# Patient Record
Sex: MALE | Race: WHITE | NOT HISPANIC OR LATINO | Employment: OTHER
[De-identification: names, ages, dates, MRNs, and addresses within clinical notes are randomized per-mention and may not be internally consistent; named-entity substitution may affect disease eponyms.]

---

## 2017-02-08 ENCOUNTER — IMAGING SERVICES (OUTPATIENT)
Dept: OTHER | Age: 72
End: 2017-02-08

## 2017-02-08 ENCOUNTER — HOSPITAL (OUTPATIENT)
Dept: OTHER | Age: 72
End: 2017-02-08

## 2017-07-21 ENCOUNTER — EXTERNAL RECORD (OUTPATIENT)
Dept: HEALTH INFORMATION MANAGEMENT | Age: 72
End: 2017-07-21

## 2017-07-21 ENCOUNTER — HOSPITAL (OUTPATIENT)
Dept: OTHER | Age: 72
End: 2017-07-21
Attending: INTERNAL MEDICINE

## 2017-07-21 ENCOUNTER — CHARTING TRANS (OUTPATIENT)
Dept: OTHER | Age: 72
End: 2017-07-21

## 2017-07-21 LAB
GLUCOSE BLDC GLUCOMTR-MCNC: 155 MG/DL (ref 65–99)
GLUCOSE BLDC GLUCOMTR-MCNC: 197 MG/DL (ref 65–99)

## 2017-07-22 ENCOUNTER — DIAGNOSTIC TRANS (OUTPATIENT)
Dept: OTHER | Age: 72
End: 2017-07-22

## 2017-07-22 LAB — GLUCOSE BLDC GLUCOMTR-MCNC: 243 MG/DL (ref 65–99)

## 2017-09-07 ENCOUNTER — DIAGNOSTIC TRANS (OUTPATIENT)
Dept: OTHER | Age: 72
End: 2017-09-07

## 2017-09-07 ENCOUNTER — HOSPITAL (OUTPATIENT)
Dept: OTHER | Age: 72
End: 2017-09-07
Attending: EMERGENCY MEDICINE

## 2017-09-07 LAB
ALBUMIN SERPL-MCNC: 4.5 GM/DL (ref 3.6–5.1)
ALBUMIN/GLOB SERPL: 1.3 {RATIO} (ref 1–2.4)
ALP SERPL-CCNC: 58 UNIT/L (ref 45–117)
ALT SERPL-CCNC: 22 UNIT/L
ANALYZER ANC (IANC): ABNORMAL
ANION GAP SERPL CALC-SCNC: 16 MMOL/L (ref 10–20)
APTT PPP: 25 SECONDS (ref 22–30)
APTT PPP: NORMAL S
AST SERPL-CCNC: 16 UNIT/L
BASOPHILS # BLD: 0 THOUSAND/MCL (ref 0–0.3)
BASOPHILS NFR BLD: 0 %
BILIRUB SERPL-MCNC: 0.9 MG/DL (ref 0.2–1)
BNP SERPL-MCNC: 17 PG/ML
BUN SERPL-MCNC: 13 MG/DL (ref 6–20)
BUN/CREAT SERPL: 16 (ref 7–25)
CALCIUM SERPL-MCNC: 10.6 MG/DL (ref 8.4–10.2)
CHLORIDE: 100 MMOL/L (ref 98–107)
CO2 SERPL-SCNC: 25 MMOL/L (ref 21–32)
CREAT SERPL-MCNC: 0.82 MG/DL (ref 0.67–1.17)
DIFFERENTIAL METHOD BLD: ABNORMAL
EOSINOPHIL # BLD: 0 THOUSAND/MCL (ref 0.1–0.5)
EOSINOPHIL NFR BLD: 0 %
ERYTHROCYTE [DISTWIDTH] IN BLOOD: 12.4 % (ref 11–15)
GLOBULIN SER-MCNC: 3.6 GM/DL (ref 2–4)
GLUCOSE SERPL-MCNC: 298 MG/DL (ref 65–99)
HEMATOCRIT: 42.5 % (ref 39–51)
HGB BLD-MCNC: 14.6 GM/DL (ref 13–17)
INR PPP: 1
LYMPHOCYTES # BLD: 1.1 THOUSAND/MCL (ref 1–4)
LYMPHOCYTES NFR BLD: 14 %
MAGNESIUM SERPL-MCNC: 1.8 MG/DL (ref 1.7–2.4)
MCH RBC QN AUTO: 28.9 PG (ref 26–34)
MCHC RBC AUTO-ENTMCNC: 34.4 GM/DL (ref 32–36.5)
MCV RBC AUTO: 84.2 FL (ref 78–100)
MONOCYTES # BLD: 1.4 THOUSAND/MCL (ref 0.3–0.9)
MONOCYTES NFR BLD: 17 %
NEUTROPHILS # BLD: 5.5 THOUSAND/MCL (ref 1.8–7.7)
NEUTROPHILS NFR BLD: 69 %
NEUTS SEG NFR BLD: ABNORMAL %
PERCENT NRBC: ABNORMAL
PLATELET # BLD: 185 THOUSAND/MCL (ref 140–450)
POTASSIUM SERPL-SCNC: 3.6 MMOL/L (ref 3.4–5.1)
PROT SERPL-MCNC: 8.1 GM/DL (ref 6.4–8.2)
PROTHROMBIN TIME: 11.6 SECONDS (ref 9.7–11.8)
PROTHROMBIN TIME: NORMAL
RBC # BLD: 5.05 MILLION/MCL (ref 4.5–5.9)
SODIUM SERPL-SCNC: 137 MMOL/L (ref 135–145)
TROPONIN I SERPL HS-MCNC: <0.02 NG/ML
TROPONIN I SERPL HS-MCNC: <0.02 NG/ML
WBC # BLD: 8 THOUSAND/MCL (ref 4.2–11)

## 2017-09-08 ENCOUNTER — CHARTING TRANS (OUTPATIENT)
Dept: OTHER | Age: 72
End: 2017-09-08

## 2017-09-12 PROBLEM — I47.1 PAROXYSMAL SVT (SUPRAVENTRICULAR TACHYCARDIA) (HCC): Status: ACTIVE | Noted: 2017-09-12

## 2017-09-12 PROBLEM — Z98.61 CAD S/P PERCUTANEOUS CORONARY ANGIOPLASTY: Status: ACTIVE | Noted: 2017-09-12

## 2017-09-12 PROBLEM — E13.9 DIABETES 1.5, MANAGED AS TYPE 2 (HCC): Status: ACTIVE | Noted: 2017-09-12

## 2017-09-12 PROBLEM — R55 SYNCOPE AND COLLAPSE: Status: ACTIVE | Noted: 2017-09-12

## 2017-09-12 PROBLEM — M54.41 CHRONIC MIDLINE LOW BACK PAIN WITH BILATERAL SCIATICA: Status: ACTIVE | Noted: 2017-09-12

## 2017-09-12 PROBLEM — G89.29 CHRONIC MIDLINE LOW BACK PAIN WITH BILATERAL SCIATICA: Status: ACTIVE | Noted: 2017-09-12

## 2017-09-12 PROBLEM — M54.42 CHRONIC MIDLINE LOW BACK PAIN WITH BILATERAL SCIATICA: Status: ACTIVE | Noted: 2017-09-12

## 2017-09-12 PROBLEM — I25.10 CAD S/P PERCUTANEOUS CORONARY ANGIOPLASTY: Status: ACTIVE | Noted: 2017-09-12

## 2017-09-12 PROBLEM — M48.00 SPINAL STENOSIS, UNSPECIFIED SPINAL REGION: Status: ACTIVE | Noted: 2017-09-12

## 2017-10-09 ENCOUNTER — CHARTING TRANS (OUTPATIENT)
Dept: OTHER | Age: 72
End: 2017-10-09

## 2018-01-14 PROBLEM — E11.59 TYPE 2 DIABETES MELLITUS WITH CIRCULATORY DISORDER, WITHOUT LONG-TERM CURRENT USE OF INSULIN (HCC): Status: ACTIVE | Noted: 2018-01-14

## 2018-01-18 PROBLEM — R55 SYNCOPE AND COLLAPSE: Status: RESOLVED | Noted: 2017-09-12 | Resolved: 2018-01-18

## 2018-03-29 ENCOUNTER — CHARTING TRANS (OUTPATIENT)
Dept: OTHER | Age: 73
End: 2018-03-29

## 2018-06-15 ENCOUNTER — HOSPITAL (OUTPATIENT)
Dept: OTHER | Age: 73
End: 2018-06-15

## 2018-06-15 LAB
BUN SERPL-MCNC: 14 MG/DL (ref 6–20)
BUN/CREAT SERPL: 17 (ref 7–25)
CREAT SERPL-MCNC: 0.83 MG/DL (ref 0.67–1.17)

## 2018-06-19 ENCOUNTER — HOSPITAL (OUTPATIENT)
Dept: OTHER | Age: 73
End: 2018-06-19
Attending: FAMILY MEDICINE

## 2018-07-09 ENCOUNTER — CHARTING TRANS (OUTPATIENT)
Dept: OTHER | Age: 73
End: 2018-07-09

## 2018-07-21 PROBLEM — I70.0 THORACIC AORTA ATHEROSCLEROSIS (HCC): Status: ACTIVE | Noted: 2017-01-01

## 2018-07-21 PROBLEM — I77.1 TORTUOUS AORTA (HCC): Status: ACTIVE | Noted: 2017-09-01

## 2018-09-11 ENCOUNTER — CHARTING TRANS (OUTPATIENT)
Dept: OTHER | Age: 73
End: 2018-09-11

## 2018-09-17 PROBLEM — M47.819 SPONDYLOARTHRITIS: Status: ACTIVE | Noted: 2018-05-16

## 2018-11-01 VITALS
SYSTOLIC BLOOD PRESSURE: 138 MMHG | HEIGHT: 71 IN | DIASTOLIC BLOOD PRESSURE: 60 MMHG | HEART RATE: 88 BPM | BODY MASS INDEX: 25.2 KG/M2 | WEIGHT: 179.99 LBS

## 2018-11-02 VITALS
HEART RATE: 100 BPM | HEIGHT: 70 IN | BODY MASS INDEX: 27.2 KG/M2 | OXYGEN SATURATION: 97 % | DIASTOLIC BLOOD PRESSURE: 66 MMHG | WEIGHT: 190 LBS | SYSTOLIC BLOOD PRESSURE: 136 MMHG

## 2018-11-02 VITALS
WEIGHT: 194 LBS | SYSTOLIC BLOOD PRESSURE: 105 MMHG | HEART RATE: 92 BPM | HEIGHT: 71 IN | DIASTOLIC BLOOD PRESSURE: 60 MMHG | BODY MASS INDEX: 27.16 KG/M2

## 2018-11-27 VITALS
DIASTOLIC BLOOD PRESSURE: 64 MMHG | SYSTOLIC BLOOD PRESSURE: 124 MMHG | WEIGHT: 181 LBS | HEART RATE: 68 BPM | HEIGHT: 71 IN | BODY MASS INDEX: 25.34 KG/M2

## 2019-02-19 RX ORDER — NITROGLYCERIN 0.4 MG/1
TABLET SUBLINGUAL
COMMUNITY
End: 2020-05-04 | Stop reason: SDUPTHER

## 2019-02-19 RX ORDER — HYDROCODONE BITARTRATE AND ACETAMINOPHEN 7.5; 325 MG/1; MG/1
TABLET ORAL
COMMUNITY
Start: 2018-09-11 | End: 2019-03-10

## 2019-02-21 PROBLEM — M47.819 SPONDYLOARTHROPATHY: Status: ACTIVE | Noted: 2018-05-16

## 2019-02-22 PROBLEM — R35.1 BENIGN PROSTATIC HYPERPLASIA WITH NOCTURIA: Status: ACTIVE | Noted: 2018-02-22

## 2019-02-22 PROBLEM — N40.1 BENIGN PROSTATIC HYPERPLASIA WITH NOCTURIA: Status: ACTIVE | Noted: 2018-02-22

## 2019-02-25 PROBLEM — I25.10 CAD (CORONARY ARTERY DISEASE): Status: ACTIVE | Noted: 2019-02-25

## 2019-02-25 PROBLEM — R94.09 ABNORMAL TILT TABLE TEST: Status: ACTIVE | Noted: 2019-02-25

## 2019-02-25 PROBLEM — I95.1 ORTHOSTATIC HYPOTENSION: Status: ACTIVE | Noted: 2019-02-25

## 2019-02-25 PROBLEM — I63.9 STROKE (CMD): Status: ACTIVE | Noted: 2019-02-25

## 2019-02-25 PROBLEM — R06.02 SHORTNESS OF BREATH: Status: ACTIVE | Noted: 2019-02-25

## 2019-02-25 RX ORDER — FAMOTIDINE 20 MG/1
TABLET, FILM COATED ORAL EVERY 12 HOURS
COMMUNITY

## 2019-02-25 RX ORDER — TRAMADOL HYDROCHLORIDE 50 MG/1
TABLET ORAL
COMMUNITY
End: 2021-11-11

## 2019-02-25 RX ORDER — CLOPIDOGREL BISULFATE 75 MG/1
75 TABLET ORAL DAILY
COMMUNITY
End: 2022-10-14 | Stop reason: ALTCHOICE

## 2019-02-25 RX ORDER — CHLORAL HYDRATE 500 MG
1000 CAPSULE ORAL DAILY
COMMUNITY

## 2019-03-01 ENCOUNTER — OFFICE VISIT (OUTPATIENT)
Dept: CARDIOLOGY | Age: 74
End: 2019-03-01

## 2019-03-01 VITALS
WEIGHT: 176 LBS | HEART RATE: 110 BPM | DIASTOLIC BLOOD PRESSURE: 60 MMHG | SYSTOLIC BLOOD PRESSURE: 120 MMHG | BODY MASS INDEX: 23.84 KG/M2 | HEIGHT: 72 IN

## 2019-03-01 DIAGNOSIS — I47.19 ATRIAL TACHYCARDIA: ICD-10-CM

## 2019-03-01 DIAGNOSIS — I95.1 ORTHOSTATIC HYPOTENSION: ICD-10-CM

## 2019-03-01 DIAGNOSIS — E78.5 HYPERLIPIDEMIA, UNSPECIFIED HYPERLIPIDEMIA TYPE: ICD-10-CM

## 2019-03-01 DIAGNOSIS — I25.10 CORONARY ARTERY DISEASE INVOLVING NATIVE HEART WITHOUT ANGINA PECTORIS, UNSPECIFIED VESSEL OR LESION TYPE: ICD-10-CM

## 2019-03-01 DIAGNOSIS — I63.9 CEREBROVASCULAR ACCIDENT (CVA), UNSPECIFIED MECHANISM (CMD): Primary | ICD-10-CM

## 2019-03-01 PROCEDURE — 99215 OFFICE O/P EST HI 40 MIN: CPT | Performed by: INTERNAL MEDICINE

## 2019-03-01 PROCEDURE — 93000 ELECTROCARDIOGRAM COMPLETE: CPT | Performed by: INTERNAL MEDICINE

## 2019-03-01 RX ORDER — FINASTERIDE 5 MG/1
1 TABLET, FILM COATED ORAL NIGHTLY
COMMUNITY
Start: 2017-09-01 | End: 2022-06-03 | Stop reason: DRUGHIGH

## 2019-03-01 RX ORDER — ROSUVASTATIN CALCIUM 5 MG/1
TABLET, COATED ORAL
COMMUNITY
Start: 2019-01-23

## 2019-03-01 RX ORDER — GLIPIZIDE 5 MG/1
TABLET ORAL
COMMUNITY
Start: 2018-01-18

## 2019-03-01 SDOH — HEALTH STABILITY: MENTAL HEALTH: HOW OFTEN DO YOU HAVE A DRINK CONTAINING ALCOHOL?: NEVER

## 2019-03-01 ASSESSMENT — ENCOUNTER SYMPTOMS
RESPIRATORY NEGATIVE: 1
GASTROINTESTINAL NEGATIVE: 1
ENDOCRINE NEGATIVE: 1
CONSTITUTIONAL NEGATIVE: 1
ORTHOPNEA: 0
SYNCOPE: 0
NEUROLOGICAL NEGATIVE: 1
HEMATOLOGIC/LYMPHATIC NEGATIVE: 1
ALLERGIC/IMMUNOLOGIC NEGATIVE: 1
PSYCHIATRIC NEGATIVE: 1
SHORTNESS OF BREATH: 0
EYES NEGATIVE: 1
COUGH: 0

## 2019-03-20 ENCOUNTER — LAB SERVICES (OUTPATIENT)
Dept: LAB | Age: 74
End: 2019-03-20

## 2019-03-20 DIAGNOSIS — I47.19 ATRIAL TACHYCARDIA: ICD-10-CM

## 2019-03-20 LAB
BASOPHILS # BLD AUTO: 0 K/MCL (ref 0–0.3)
BASOPHILS NFR BLD AUTO: 0 %
DIFFERENTIAL METHOD BLD: ABNORMAL
EOSINOPHIL # BLD AUTO: 0 K/MCL (ref 0.1–0.5)
EOSINOPHIL NFR SPEC: 0 %
ERYTHROCYTE [DISTWIDTH] IN BLOOD: 11.9 % (ref 11–15)
HCT VFR BLD CALC: 45 % (ref 39–51)
HGB BLD-MCNC: 15.4 G/DL (ref 13–17)
IMM GRANULOCYTES # BLD AUTO: 0.1 K/MCL (ref 0–0.2)
IMM GRANULOCYTES NFR BLD: 1 %
LYMPHOCYTES # BLD MANUAL: 1.8 K/MCL (ref 1–4)
LYMPHOCYTES NFR BLD MANUAL: 18 %
MCH RBC QN AUTO: 29.6 PG (ref 26–34)
MCHC RBC AUTO-ENTMCNC: 34.2 G/DL (ref 32–36.5)
MCV RBC AUTO: 86.5 FL (ref 78–100)
MONOCYTES # BLD MANUAL: 1.8 K/MCL (ref 0.3–0.9)
MONOCYTES NFR BLD MANUAL: 18 %
NEUTROPHILS # BLD: 6.2 K/MCL (ref 1.8–7.7)
NEUTROPHILS NFR BLD AUTO: 63 %
NRBC BLD MANUAL-RTO: 0 /100 WBC
PLATELET # BLD: 157 K/MCL (ref 140–450)
RBC # BLD: 5.2 MIL/MCL (ref 4.5–5.9)
TSH SERPL-ACNC: 0.87 MCUNITS/ML (ref 0.35–5)
WBC # BLD: 9.8 K/MCL (ref 4.2–11)

## 2019-03-26 ENCOUNTER — TELEPHONE (OUTPATIENT)
Dept: CARDIOLOGY | Age: 74
End: 2019-03-26

## 2019-05-13 PROCEDURE — 81003 URINALYSIS AUTO W/O SCOPE: CPT | Performed by: FAMILY MEDICINE

## 2019-06-14 ENCOUNTER — TELEPHONE (OUTPATIENT)
Dept: CARDIOLOGY | Age: 74
End: 2019-06-14

## 2019-08-27 ENCOUNTER — EXTERNAL RECORD (OUTPATIENT)
Dept: HEALTH INFORMATION MANAGEMENT | Age: 74
End: 2019-08-27

## 2019-08-27 PROBLEM — I47.1 ATRIAL TACHYCARDIA (HCC): Status: ACTIVE | Noted: 2019-03-01

## 2019-08-27 PROBLEM — R94.09 ABNORMAL TILT TABLE TEST: Status: ACTIVE | Noted: 2019-02-25

## 2019-09-09 ENCOUNTER — APPOINTMENT (OUTPATIENT)
Dept: CARDIOLOGY | Age: 74
End: 2019-09-09

## 2019-10-07 ENCOUNTER — OFFICE VISIT (OUTPATIENT)
Dept: CARDIOLOGY | Age: 74
End: 2019-10-07

## 2019-10-07 VITALS
SYSTOLIC BLOOD PRESSURE: 122 MMHG | DIASTOLIC BLOOD PRESSURE: 58 MMHG | HEART RATE: 68 BPM | HEIGHT: 72 IN | WEIGHT: 175 LBS | BODY MASS INDEX: 23.7 KG/M2

## 2019-10-07 DIAGNOSIS — I47.19 ATRIAL TACHYCARDIA: ICD-10-CM

## 2019-10-07 DIAGNOSIS — I95.1 ORTHOSTATIC HYPOTENSION: ICD-10-CM

## 2019-10-07 DIAGNOSIS — I25.10 CORONARY ARTERY DISEASE INVOLVING NATIVE HEART WITHOUT ANGINA PECTORIS, UNSPECIFIED VESSEL OR LESION TYPE: ICD-10-CM

## 2019-10-07 DIAGNOSIS — E78.5 HYPERLIPIDEMIA, UNSPECIFIED HYPERLIPIDEMIA TYPE: Primary | ICD-10-CM

## 2019-10-07 PROCEDURE — 99214 OFFICE O/P EST MOD 30 MIN: CPT | Performed by: INTERNAL MEDICINE

## 2019-10-07 SDOH — HEALTH STABILITY: MENTAL HEALTH: HOW OFTEN DO YOU HAVE A DRINK CONTAINING ALCOHOL?: NEVER

## 2019-10-07 ASSESSMENT — ENCOUNTER SYMPTOMS
ALLERGIC/IMMUNOLOGIC NEGATIVE: 1
NEUROLOGICAL NEGATIVE: 1
ORTHOPNEA: 0
PSYCHIATRIC NEGATIVE: 1
RESPIRATORY NEGATIVE: 1
SYNCOPE: 0
EYES NEGATIVE: 1
SHORTNESS OF BREATH: 0
CONSTITUTIONAL NEGATIVE: 1
GASTROINTESTINAL NEGATIVE: 1
COUGH: 0
HEMATOLOGIC/LYMPHATIC NEGATIVE: 1
ENDOCRINE NEGATIVE: 1

## 2019-10-07 ASSESSMENT — PATIENT HEALTH QUESTIONNAIRE - PHQ9
2. FEELING DOWN, DEPRESSED OR HOPELESS: NOT AT ALL
SUM OF ALL RESPONSES TO PHQ9 QUESTIONS 1 AND 2: 0
SUM OF ALL RESPONSES TO PHQ9 QUESTIONS 1 AND 2: 0
1. LITTLE INTEREST OR PLEASURE IN DOING THINGS: NOT AT ALL

## 2019-11-11 ENCOUNTER — TELEPHONE (OUTPATIENT)
Dept: CARDIOLOGY | Age: 74
End: 2019-11-11

## 2019-11-23 PROBLEM — R94.09 ABNORMAL TILT TABLE TEST: Status: RESOLVED | Noted: 2019-02-25 | Resolved: 2019-11-23

## 2019-12-05 PROBLEM — Z96.641 STATUS POST TOTAL REPLACEMENT OF RIGHT HIP: Status: ACTIVE | Noted: 2019-12-02

## 2019-12-05 PROBLEM — D72.829 LEUKOCYTOSIS: Status: ACTIVE | Noted: 2019-12-05

## 2019-12-07 PROBLEM — D62 ACUTE POSTOPERATIVE ANEMIA DUE TO EXPECTED BLOOD LOSS: Status: ACTIVE | Noted: 2019-12-07

## 2019-12-07 PROBLEM — D69.6 THROMBOCYTOPENIA (HCC): Status: ACTIVE | Noted: 2019-12-07

## 2020-03-02 ENCOUNTER — EXTERNAL RECORD (OUTPATIENT)
Dept: HEALTH INFORMATION MANAGEMENT | Age: 75
End: 2020-03-02

## 2020-04-08 DIAGNOSIS — E78.5 HYPERLIPIDEMIA, UNSPECIFIED HYPERLIPIDEMIA TYPE: Primary | ICD-10-CM

## 2020-04-13 ENCOUNTER — APPOINTMENT (OUTPATIENT)
Dept: CARDIOLOGY | Age: 75
End: 2020-04-13

## 2020-05-04 ENCOUNTER — OFFICE VISIT (OUTPATIENT)
Dept: CARDIOLOGY | Age: 75
End: 2020-05-04

## 2020-05-04 VITALS — SYSTOLIC BLOOD PRESSURE: 127 MMHG | DIASTOLIC BLOOD PRESSURE: 82 MMHG

## 2020-05-04 DIAGNOSIS — E78.5 HYPERLIPIDEMIA, UNSPECIFIED HYPERLIPIDEMIA TYPE: ICD-10-CM

## 2020-05-04 DIAGNOSIS — I95.1 ORTHOSTATIC HYPOTENSION: Primary | ICD-10-CM

## 2020-05-04 DIAGNOSIS — R94.09 ABNORMAL TILT TABLE TEST: ICD-10-CM

## 2020-05-04 DIAGNOSIS — I25.10 CORONARY ARTERY DISEASE INVOLVING NATIVE HEART WITHOUT ANGINA PECTORIS, UNSPECIFIED VESSEL OR LESION TYPE: ICD-10-CM

## 2020-05-04 DIAGNOSIS — I47.19 ATRIAL TACHYCARDIA: ICD-10-CM

## 2020-05-04 DIAGNOSIS — I63.9 CEREBROVASCULAR ACCIDENT (CVA), UNSPECIFIED MECHANISM (CMD): ICD-10-CM

## 2020-05-04 PROCEDURE — 99441 TELEPHONE E&M BY PHYSICIAN EST PT NOT ORIG PREV 7 DAYS 5-10 MIN: CPT | Performed by: INTERNAL MEDICINE

## 2020-05-04 RX ORDER — NITROGLYCERIN 0.4 MG/1
0.4 TABLET SUBLINGUAL EVERY 5 MIN PRN
Qty: 25 TABLET | Refills: 3 | Status: SHIPPED | OUTPATIENT
Start: 2020-05-04 | End: 2021-11-11 | Stop reason: SDUPTHER

## 2020-05-04 SDOH — HEALTH STABILITY: MENTAL HEALTH: HOW OFTEN DO YOU HAVE A DRINK CONTAINING ALCOHOL?: NEVER

## 2020-05-04 ASSESSMENT — ENCOUNTER SYMPTOMS
SHORTNESS OF BREATH: 0
CONSTITUTIONAL NEGATIVE: 1
COUGH: 0
SYNCOPE: 0
ENDOCRINE NEGATIVE: 1
DIZZINESS: 1
RESPIRATORY NEGATIVE: 1
HEMATOLOGIC/LYMPHATIC NEGATIVE: 1
PSYCHIATRIC NEGATIVE: 1
GASTROINTESTINAL NEGATIVE: 1
ALLERGIC/IMMUNOLOGIC NEGATIVE: 1
EYES NEGATIVE: 1
ORTHOPNEA: 0

## 2020-05-04 ASSESSMENT — PATIENT HEALTH QUESTIONNAIRE - PHQ9
SUM OF ALL RESPONSES TO PHQ9 QUESTIONS 1 AND 2: 0
1. LITTLE INTEREST OR PLEASURE IN DOING THINGS: NOT AT ALL
SUM OF ALL RESPONSES TO PHQ9 QUESTIONS 1 AND 2: 0
2. FEELING DOWN, DEPRESSED OR HOPELESS: NOT AT ALL

## 2020-07-14 ENCOUNTER — TELEPHONE (OUTPATIENT)
Dept: CARDIOLOGY | Age: 75
End: 2020-07-14

## 2020-07-21 ENCOUNTER — TELEPHONE (OUTPATIENT)
Dept: CARDIOLOGY | Age: 75
End: 2020-07-21

## 2020-08-17 PROBLEM — R97.20 ELEVATED PSA: Status: ACTIVE | Noted: 2020-08-17

## 2020-08-30 PROBLEM — D72.829 LEUKOCYTOSIS: Status: RESOLVED | Noted: 2019-12-05 | Resolved: 2020-08-30

## 2020-08-30 PROBLEM — D62 ACUTE POSTOPERATIVE ANEMIA DUE TO EXPECTED BLOOD LOSS: Status: RESOLVED | Noted: 2019-12-07 | Resolved: 2020-08-30

## 2020-09-11 ENCOUNTER — EXTERNAL RECORD (OUTPATIENT)
Dept: HEALTH INFORMATION MANAGEMENT | Age: 75
End: 2020-09-11

## 2020-11-03 ENCOUNTER — OFFICE VISIT (OUTPATIENT)
Dept: CARDIOLOGY | Age: 75
End: 2020-11-03

## 2020-11-03 VITALS
HEART RATE: 60 BPM | WEIGHT: 180 LBS | BODY MASS INDEX: 25.2 KG/M2 | SYSTOLIC BLOOD PRESSURE: 142 MMHG | DIASTOLIC BLOOD PRESSURE: 74 MMHG | HEIGHT: 71 IN

## 2020-11-03 DIAGNOSIS — E78.5 HYPERLIPIDEMIA, UNSPECIFIED HYPERLIPIDEMIA TYPE: ICD-10-CM

## 2020-11-03 DIAGNOSIS — I25.10 CORONARY ARTERY DISEASE INVOLVING NATIVE HEART WITHOUT ANGINA PECTORIS, UNSPECIFIED VESSEL OR LESION TYPE: Primary | ICD-10-CM

## 2020-11-03 DIAGNOSIS — I47.19 ATRIAL TACHYCARDIA: ICD-10-CM

## 2020-11-03 DIAGNOSIS — R94.09 ABNORMAL TILT TABLE TEST: ICD-10-CM

## 2020-11-03 DIAGNOSIS — I63.9 CEREBROVASCULAR ACCIDENT (CVA), UNSPECIFIED MECHANISM (CMD): ICD-10-CM

## 2020-11-03 PROCEDURE — 99214 OFFICE O/P EST MOD 30 MIN: CPT | Performed by: INTERNAL MEDICINE

## 2020-11-03 ASSESSMENT — ENCOUNTER SYMPTOMS
HEMATOLOGIC/LYMPHATIC NEGATIVE: 1
RESPIRATORY NEGATIVE: 1
PSYCHIATRIC NEGATIVE: 1
SHORTNESS OF BREATH: 0
COUGH: 0
EYES NEGATIVE: 1
ALLERGIC/IMMUNOLOGIC NEGATIVE: 1
GASTROINTESTINAL NEGATIVE: 1
ORTHOPNEA: 0
DIZZINESS: 0
CONSTITUTIONAL NEGATIVE: 1
ENDOCRINE NEGATIVE: 1
NEUROLOGICAL NEGATIVE: 1
SYNCOPE: 0

## 2021-04-05 PROBLEM — M47.819 SPONDYLOARTHROPATHY: Status: RESOLVED | Noted: 2018-05-16 | Resolved: 2021-04-05

## 2021-04-19 ENCOUNTER — EXTERNAL RECORD (OUTPATIENT)
Dept: HEALTH INFORMATION MANAGEMENT | Age: 76
End: 2021-04-19

## 2021-05-11 ENCOUNTER — OFFICE VISIT (OUTPATIENT)
Dept: CARDIOLOGY | Age: 76
End: 2021-05-11

## 2021-05-11 VITALS
HEIGHT: 71 IN | SYSTOLIC BLOOD PRESSURE: 114 MMHG | HEART RATE: 74 BPM | BODY MASS INDEX: 25.2 KG/M2 | DIASTOLIC BLOOD PRESSURE: 76 MMHG | WEIGHT: 180 LBS

## 2021-05-11 DIAGNOSIS — I47.10 PSVT (PAROXYSMAL SUPRAVENTRICULAR TACHYCARDIA): ICD-10-CM

## 2021-05-11 DIAGNOSIS — I25.10 CORONARY ARTERY DISEASE INVOLVING NATIVE HEART WITHOUT ANGINA PECTORIS, UNSPECIFIED VESSEL OR LESION TYPE: Primary | ICD-10-CM

## 2021-05-11 DIAGNOSIS — E78.5 HYPERLIPIDEMIA, UNSPECIFIED HYPERLIPIDEMIA TYPE: ICD-10-CM

## 2021-05-11 PROCEDURE — 99214 OFFICE O/P EST MOD 30 MIN: CPT | Performed by: INTERNAL MEDICINE

## 2021-05-11 RX ORDER — HYDROCODONE BITARTRATE AND ACETAMINOPHEN 7.5; 325 MG/1; MG/1
1 TABLET ORAL EVERY 6 HOURS PRN
COMMUNITY
Start: 2022-05-24

## 2021-05-11 ASSESSMENT — PATIENT HEALTH QUESTIONNAIRE - PHQ9
SUM OF ALL RESPONSES TO PHQ9 QUESTIONS 1 AND 2: 0
2. FEELING DOWN, DEPRESSED OR HOPELESS: NOT AT ALL
CLINICAL INTERPRETATION OF PHQ9 SCORE: NO FURTHER SCREENING NEEDED
CLINICAL INTERPRETATION OF PHQ2 SCORE: NO FURTHER SCREENING NEEDED
1. LITTLE INTEREST OR PLEASURE IN DOING THINGS: NOT AT ALL
SUM OF ALL RESPONSES TO PHQ9 QUESTIONS 1 AND 2: 0

## 2021-05-11 ASSESSMENT — ENCOUNTER SYMPTOMS
SHORTNESS OF BREATH: 0
GASTROINTESTINAL NEGATIVE: 1
EYES NEGATIVE: 1
NEUROLOGICAL NEGATIVE: 1
CONSTITUTIONAL NEGATIVE: 1
ORTHOPNEA: 0
COUGH: 0
HEMATOLOGIC/LYMPHATIC NEGATIVE: 1
SYNCOPE: 0
RESPIRATORY NEGATIVE: 1
ENDOCRINE NEGATIVE: 1
DIZZINESS: 0
ALLERGIC/IMMUNOLOGIC NEGATIVE: 1
PSYCHIATRIC NEGATIVE: 1

## 2021-05-27 ENCOUNTER — TELEPHONE (OUTPATIENT)
Dept: CARDIOLOGY | Age: 76
End: 2021-05-27

## 2021-06-01 ENCOUNTER — TELEPHONE (OUTPATIENT)
Dept: CARDIOLOGY | Age: 76
End: 2021-06-01

## 2021-10-02 PROBLEM — Z86.2 HISTORY OF THROMBOCYTOPENIA: Status: ACTIVE | Noted: 2021-10-02

## 2021-10-02 PROBLEM — D69.6 THROMBOCYTOPENIA (HCC): Status: RESOLVED | Noted: 2019-12-07 | Resolved: 2021-10-02

## 2021-11-11 ENCOUNTER — OFFICE VISIT (OUTPATIENT)
Dept: CARDIOLOGY | Age: 76
End: 2021-11-11

## 2021-11-11 VITALS
HEIGHT: 71 IN | HEART RATE: 64 BPM | WEIGHT: 190 LBS | DIASTOLIC BLOOD PRESSURE: 68 MMHG | BODY MASS INDEX: 26.6 KG/M2 | SYSTOLIC BLOOD PRESSURE: 148 MMHG

## 2021-11-11 DIAGNOSIS — I25.10 CORONARY ARTERY DISEASE INVOLVING NATIVE HEART WITHOUT ANGINA PECTORIS, UNSPECIFIED VESSEL OR LESION TYPE: Primary | ICD-10-CM

## 2021-11-11 PROCEDURE — 99214 OFFICE O/P EST MOD 30 MIN: CPT | Performed by: INTERNAL MEDICINE

## 2021-11-11 RX ORDER — NITROGLYCERIN 0.4 MG/1
0.4 TABLET SUBLINGUAL EVERY 5 MIN PRN
Qty: 25 TABLET | Refills: 3 | Status: SHIPPED | OUTPATIENT
Start: 2021-11-11

## 2021-11-11 ASSESSMENT — ENCOUNTER SYMPTOMS
CONSTITUTIONAL NEGATIVE: 1
DIZZINESS: 0
EYES NEGATIVE: 1
GASTROINTESTINAL NEGATIVE: 1
SYNCOPE: 0
RESPIRATORY NEGATIVE: 1
COUGH: 0
ENDOCRINE NEGATIVE: 1
PSYCHIATRIC NEGATIVE: 1
SHORTNESS OF BREATH: 0
HEMATOLOGIC/LYMPHATIC NEGATIVE: 1
ALLERGIC/IMMUNOLOGIC NEGATIVE: 1
ORTHOPNEA: 0
NEUROLOGICAL NEGATIVE: 1

## 2022-05-11 ENCOUNTER — OFFICE VISIT (OUTPATIENT)
Dept: CARDIOLOGY | Age: 77
End: 2022-05-11

## 2022-05-11 VITALS
HEIGHT: 71 IN | DIASTOLIC BLOOD PRESSURE: 58 MMHG | HEART RATE: 80 BPM | SYSTOLIC BLOOD PRESSURE: 110 MMHG | BODY MASS INDEX: 25.48 KG/M2 | WEIGHT: 182 LBS

## 2022-05-11 DIAGNOSIS — I47.19 ATRIAL TACHYCARDIA: ICD-10-CM

## 2022-05-11 DIAGNOSIS — E78.5 HYPERLIPIDEMIA, UNSPECIFIED HYPERLIPIDEMIA TYPE: ICD-10-CM

## 2022-05-11 DIAGNOSIS — I25.10 CORONARY ARTERY DISEASE INVOLVING NATIVE HEART WITHOUT ANGINA PECTORIS, UNSPECIFIED VESSEL OR LESION TYPE: Primary | ICD-10-CM

## 2022-05-11 PROCEDURE — 99214 OFFICE O/P EST MOD 30 MIN: CPT | Performed by: INTERNAL MEDICINE

## 2022-05-11 RX ORDER — UBIDECARENONE 200 MG
1 CAPSULE ORAL DAILY
COMMUNITY

## 2022-05-11 ASSESSMENT — ENCOUNTER SYMPTOMS
EYES NEGATIVE: 1
DIZZINESS: 0
GASTROINTESTINAL NEGATIVE: 1
ORTHOPNEA: 0
ALLERGIC/IMMUNOLOGIC NEGATIVE: 1
RESPIRATORY NEGATIVE: 1
NEUROLOGICAL NEGATIVE: 1
COUGH: 0
SYNCOPE: 0
HEMATOLOGIC/LYMPHATIC NEGATIVE: 1
PSYCHIATRIC NEGATIVE: 1
ENDOCRINE NEGATIVE: 1
SHORTNESS OF BREATH: 0
CONSTITUTIONAL NEGATIVE: 1

## 2022-05-19 ENCOUNTER — APPOINTMENT (OUTPATIENT)
Dept: GENERAL RADIOLOGY | Age: 77
End: 2022-05-19
Attending: EMERGENCY MEDICINE

## 2022-05-19 ENCOUNTER — HOSPITAL ENCOUNTER (OUTPATIENT)
Age: 77
Setting detail: OBSERVATION
Discharge: HOME | End: 2022-05-20
Attending: EMERGENCY MEDICINE | Admitting: INTERNAL MEDICINE

## 2022-05-19 ENCOUNTER — APPOINTMENT (OUTPATIENT)
Dept: CT IMAGING | Age: 77
End: 2022-05-19
Attending: EMERGENCY MEDICINE

## 2022-05-19 DIAGNOSIS — R42 DIZZINESS: Primary | ICD-10-CM

## 2022-05-19 DIAGNOSIS — I25.10 CORONARY ARTERY DISEASE INVOLVING NATIVE HEART WITHOUT ANGINA PECTORIS, UNSPECIFIED VESSEL OR LESION TYPE: ICD-10-CM

## 2022-05-19 LAB
ALBUMIN SERPL-MCNC: 4.6 G/DL (ref 3.6–5.1)
ALBUMIN/GLOB SERPL: 1.5 {RATIO} (ref 1–2.4)
ALP SERPL-CCNC: 46 UNITS/L (ref 45–117)
ALT SERPL-CCNC: 21 UNITS/L
ANION GAP SERPL CALC-SCNC: 9 MMOL/L (ref 7–19)
AST SERPL-CCNC: 12 UNITS/L
ATRIAL RATE (BPM): 63
BASOPHILS # BLD: 0 K/MCL (ref 0–0.3)
BASOPHILS NFR BLD: 0 %
BILIRUB SERPL-MCNC: 0.7 MG/DL (ref 0.2–1)
BUN SERPL-MCNC: 21 MG/DL (ref 6–20)
BUN/CREAT SERPL: 19 (ref 7–25)
CALCIUM SERPL-MCNC: 10.3 MG/DL (ref 8.4–10.2)
CHLORIDE SERPL-SCNC: 109 MMOL/L (ref 97–110)
CO2 SERPL-SCNC: 24 MMOL/L (ref 21–32)
CREAT SERPL-MCNC: 1.13 MG/DL (ref 0.67–1.17)
DEPRECATED RDW RBC: 39.1 FL (ref 39–50)
EOSINOPHIL # BLD: 0 K/MCL (ref 0–0.5)
EOSINOPHIL NFR BLD: 0 %
ERYTHROCYTE [DISTWIDTH] IN BLOOD: 12.6 % (ref 11–15)
FASTING DURATION TIME PATIENT: ABNORMAL H
FLUAV RNA RESP QL NAA+PROBE: NOT DETECTED
FLUBV RNA RESP QL NAA+PROBE: NOT DETECTED
GFR SERPLBLD BASED ON 1.73 SQ M-ARVRAT: 67 ML/MIN
GLOBULIN SER-MCNC: 3.1 G/DL (ref 2–4)
GLUCOSE SERPL-MCNC: 134 MG/DL (ref 70–99)
HCT VFR BLD CALC: 44.7 % (ref 39–51)
HGB BLD-MCNC: 15 G/DL (ref 13–17)
IMM GRANULOCYTES # BLD AUTO: 0.2 K/MCL (ref 0–0.2)
IMM GRANULOCYTES # BLD: 1 %
LYMPHOCYTES # BLD: 1.4 K/MCL (ref 1–4)
LYMPHOCYTES NFR BLD: 13 %
MCH RBC QN AUTO: 28.7 PG (ref 26–34)
MCHC RBC AUTO-ENTMCNC: 33.6 G/DL (ref 32–36.5)
MCV RBC AUTO: 85.5 FL (ref 78–100)
MONOCYTES # BLD: 1.5 K/MCL (ref 0.3–0.9)
MONOCYTES NFR BLD: 14 %
NEUTROPHILS # BLD: 7.8 K/MCL (ref 1.8–7.7)
NEUTROPHILS NFR BLD: 72 %
NRBC BLD MANUAL-RTO: 0 /100 WBC
P AXIS (DEGREES): 63
PLATELET # BLD AUTO: 151 K/MCL (ref 140–450)
POTASSIUM SERPL-SCNC: 5.1 MMOL/L (ref 3.4–5.1)
PR-INTERVAL (MSEC): 148
PROT SERPL-MCNC: 7.7 G/DL (ref 6.4–8.2)
QRS-INTERVAL (MSEC): 84
QT-INTERVAL (MSEC): 408
QTC: 418
R AXIS (DEGREES): 64
RBC # BLD: 5.23 MIL/MCL (ref 4.5–5.9)
REPORT TEXT: NORMAL
RSV AG NPH QL IA.RAPID: NOT DETECTED
SARS-COV-2 RNA RESP QL NAA+PROBE: NOT DETECTED
SERVICE CMNT-IMP: NORMAL
SERVICE CMNT-IMP: NORMAL
SODIUM SERPL-SCNC: 137 MMOL/L (ref 135–145)
T AXIS (DEGREES): 64
TROPONIN I SERPL DL<=0.01 NG/ML-MCNC: <4 NG/L
VENTRICULAR RATE EKG/MIN (BPM): 63
WBC # BLD: 10.9 K/MCL (ref 4.2–11)

## 2022-05-19 PROCEDURE — 85025 COMPLETE CBC W/AUTO DIFF WBC: CPT | Performed by: EMERGENCY MEDICINE

## 2022-05-19 PROCEDURE — C9803 HOPD COVID-19 SPEC COLLECT: HCPCS

## 2022-05-19 PROCEDURE — 36415 COLL VENOUS BLD VENIPUNCTURE: CPT

## 2022-05-19 PROCEDURE — 99285 EMERGENCY DEPT VISIT HI MDM: CPT

## 2022-05-19 PROCEDURE — 93010 ELECTROCARDIOGRAM REPORT: CPT | Performed by: INTERNAL MEDICINE

## 2022-05-19 PROCEDURE — 0241U COVID/FLU/RSV PANEL: CPT | Performed by: STUDENT IN AN ORGANIZED HEALTH CARE EDUCATION/TRAINING PROGRAM

## 2022-05-19 PROCEDURE — 84484 ASSAY OF TROPONIN QUANT: CPT | Performed by: EMERGENCY MEDICINE

## 2022-05-19 PROCEDURE — 93005 ELECTROCARDIOGRAM TRACING: CPT | Performed by: EMERGENCY MEDICINE

## 2022-05-19 PROCEDURE — G0378 HOSPITAL OBSERVATION PER HR: HCPCS

## 2022-05-19 PROCEDURE — 70450 CT HEAD/BRAIN W/O DYE: CPT

## 2022-05-19 PROCEDURE — G1004 CDSM NDSC: HCPCS

## 2022-05-19 PROCEDURE — 80053 COMPREHEN METABOLIC PANEL: CPT | Performed by: EMERGENCY MEDICINE

## 2022-05-19 PROCEDURE — 71046 X-RAY EXAM CHEST 2 VIEWS: CPT

## 2022-05-19 ASSESSMENT — ENCOUNTER SYMPTOMS
ABDOMINAL PAIN: 0
WEAKNESS: 0
SHORTNESS OF BREATH: 0
CHILLS: 0
COUGH: 0
FEVER: 0
NAUSEA: 0
DIZZINESS: 1
CHEST TIGHTNESS: 0
PHOTOPHOBIA: 0
VOMITING: 0
NUMBNESS: 0
BACK PAIN: 0

## 2022-05-19 ASSESSMENT — PAIN SCALES - GENERAL: PAINLEVEL_OUTOF10: 0

## 2022-05-20 ENCOUNTER — APPOINTMENT (OUTPATIENT)
Dept: CARDIOLOGY | Age: 77
End: 2022-05-20
Attending: INTERNAL MEDICINE

## 2022-05-20 VITALS
DIASTOLIC BLOOD PRESSURE: 77 MMHG | TEMPERATURE: 97.5 F | RESPIRATION RATE: 18 BRPM | HEIGHT: 71 IN | BODY MASS INDEX: 25.46 KG/M2 | HEART RATE: 90 BPM | WEIGHT: 181.88 LBS | SYSTOLIC BLOOD PRESSURE: 139 MMHG | OXYGEN SATURATION: 94 %

## 2022-05-20 LAB
AORTIC VALVE AREA: NORMAL
ASCENDING AORTA (AAD): 3.1
AV MEAN GRADIENT (AVMG): NORMAL
AV MEAN VELOCITY (AVMV): NORMAL
AV PEAK GRADIENT (AVPG): NORMAL
AV PEAK VELOCITY (AVPV): NORMAL
AV STENOSIS SEVERITY TEXT: NORMAL
E WAVE DECELARATION TIME (MDT): 180
EST RIGHT VENT SYSTOLIC PRESSURE BY TRICUSPID REGURGITATION JET (RVSP): 25.85
GLUCOSE BLDC GLUCOMTR-MCNC: 120 MG/DL (ref 70–99)
GLUCOSE BLDC GLUCOMTR-MCNC: 125 MG/DL (ref 70–99)
GLUCOSE BLDC GLUCOMTR-MCNC: 144 MG/DL (ref 70–99)
GLUCOSE BLDC GLUCOMTR-MCNC: 184 MG/DL (ref 70–99)
GLUCOSE BLDC GLUCOMTR-MCNC: 205 MG/DL (ref 70–99)
LEFT INTERNAL DIMENSION IN SYSTOLE (LVSD): 2.99
LEFT VENTRICULAR INTERNAL DIMENSION IN DIASTOLE (LVDD): 4.52
LV EF: NORMAL %
MV E TISSUE VEL LAT (MELV): 8.27
MV E TISSUE VEL MED (MESV): 5.55
MV E WAVE VEL/E TISSUE VEL MED(MSR): 12.47
RAINBOW EXTRA TUBES HOLD SPECIMEN: NORMAL
TRICUSPID ANNULAR PLANE SYSTOLIC EXCURSION (TAPSE): 2.33
TRICUSPID VALVE ANNULAR PEAK VELOCITY (TVAPV): 17.4

## 2022-05-20 PROCEDURE — G0378 HOSPITAL OBSERVATION PER HR: HCPCS

## 2022-05-20 PROCEDURE — 10004651 HB RX, NO CHARGE ITEM: Performed by: INTERNAL MEDICINE

## 2022-05-20 PROCEDURE — 93306 TTE W/DOPPLER COMPLETE: CPT | Performed by: INTERNAL MEDICINE

## 2022-05-20 PROCEDURE — 13003287

## 2022-05-20 PROCEDURE — 10002800 HB RX 250 W HCPCS: Performed by: INTERNAL MEDICINE

## 2022-05-20 PROCEDURE — 99214 OFFICE O/P EST MOD 30 MIN: CPT | Performed by: INTERNAL MEDICINE

## 2022-05-20 PROCEDURE — 82962 GLUCOSE BLOOD TEST: CPT

## 2022-05-20 PROCEDURE — 99285 EMERGENCY DEPT VISIT HI MDM: CPT | Performed by: EMERGENCY MEDICINE

## 2022-05-20 PROCEDURE — 10004281 HB COUNTER-STAFF TIME PER 15 MIN

## 2022-05-20 PROCEDURE — 96372 THER/PROPH/DIAG INJ SC/IM: CPT

## 2022-05-20 PROCEDURE — 93306 TTE W/DOPPLER COMPLETE: CPT

## 2022-05-20 PROCEDURE — 10002807 HB RX 258: Performed by: INTERNAL MEDICINE

## 2022-05-20 PROCEDURE — 99220 INITIAL OBSERVATION CARE,LEVL III: CPT | Performed by: INTERNAL MEDICINE

## 2022-05-20 PROCEDURE — 10002803 HB RX 637: Performed by: INTERNAL MEDICINE

## 2022-05-20 RX ORDER — NICOTINE POLACRILEX 4 MG
15 LOZENGE BUCCAL PRN
Status: DISCONTINUED | OUTPATIENT
Start: 2022-05-20 | End: 2022-05-20 | Stop reason: HOSPADM

## 2022-05-20 RX ORDER — FINASTERIDE 5 MG/1
5 TABLET, FILM COATED ORAL NIGHTLY
Status: DISCONTINUED | OUTPATIENT
Start: 2022-05-20 | End: 2022-05-20 | Stop reason: HOSPADM

## 2022-05-20 RX ORDER — BISACODYL 10 MG
10 SUPPOSITORY, RECTAL RECTAL DAILY PRN
Status: DISCONTINUED | OUTPATIENT
Start: 2022-05-20 | End: 2022-05-20 | Stop reason: HOSPADM

## 2022-05-20 RX ORDER — FAMOTIDINE 20 MG/1
20 TABLET, FILM COATED ORAL EVERY 12 HOURS SCHEDULED
Status: DISCONTINUED | OUTPATIENT
Start: 2022-05-20 | End: 2022-05-20 | Stop reason: HOSPADM

## 2022-05-20 RX ORDER — CLOPIDOGREL BISULFATE 75 MG/1
75 TABLET ORAL DAILY
Status: DISCONTINUED | OUTPATIENT
Start: 2022-05-20 | End: 2022-05-20 | Stop reason: HOSPADM

## 2022-05-20 RX ORDER — DEXTROSE MONOHYDRATE 25 G/50ML
12.5 INJECTION, SOLUTION INTRAVENOUS PRN
Status: DISCONTINUED | OUTPATIENT
Start: 2022-05-20 | End: 2022-05-20 | Stop reason: HOSPADM

## 2022-05-20 RX ORDER — DEXTROSE MONOHYDRATE 25 G/50ML
25 INJECTION, SOLUTION INTRAVENOUS PRN
Status: DISCONTINUED | OUTPATIENT
Start: 2022-05-20 | End: 2022-05-20 | Stop reason: HOSPADM

## 2022-05-20 RX ORDER — HEPARIN SODIUM 5000 [USP'U]/ML
5000 INJECTION, SOLUTION INTRAVENOUS; SUBCUTANEOUS EVERY 8 HOURS SCHEDULED
Status: DISCONTINUED | OUTPATIENT
Start: 2022-05-20 | End: 2022-05-20 | Stop reason: HOSPADM

## 2022-05-20 RX ORDER — GLIPIZIDE 5 MG/1
10 TABLET ORAL 2 TIMES DAILY WITH MEALS
Status: DISCONTINUED | OUTPATIENT
Start: 2022-05-20 | End: 2022-05-20 | Stop reason: HOSPADM

## 2022-05-20 RX ORDER — NICOTINE POLACRILEX 4 MG
30 LOZENGE BUCCAL PRN
Status: DISCONTINUED | OUTPATIENT
Start: 2022-05-20 | End: 2022-05-20 | Stop reason: HOSPADM

## 2022-05-20 RX ORDER — AMOXICILLIN 250 MG
2 CAPSULE ORAL DAILY PRN
Status: DISCONTINUED | OUTPATIENT
Start: 2022-05-20 | End: 2022-05-20 | Stop reason: HOSPADM

## 2022-05-20 RX ORDER — ACETAMINOPHEN 325 MG/1
650 TABLET ORAL EVERY 4 HOURS PRN
Status: DISCONTINUED | OUTPATIENT
Start: 2022-05-20 | End: 2022-05-20 | Stop reason: HOSPADM

## 2022-05-20 RX ORDER — MULTIVIT WITH MINERALS/LUTEIN
1 TABLET ORAL DAILY
COMMUNITY

## 2022-05-20 RX ORDER — 0.9 % SODIUM CHLORIDE 0.9 %
2 VIAL (ML) INJECTION EVERY 12 HOURS SCHEDULED
Status: DISCONTINUED | OUTPATIENT
Start: 2022-05-20 | End: 2022-05-20 | Stop reason: HOSPADM

## 2022-05-20 RX ORDER — ROSUVASTATIN CALCIUM 5 MG/1
5 TABLET, COATED ORAL DAILY
Status: DISCONTINUED | OUTPATIENT
Start: 2022-05-20 | End: 2022-05-20 | Stop reason: HOSPADM

## 2022-05-20 RX ORDER — SODIUM CHLORIDE 9 MG/ML
INJECTION, SOLUTION INTRAVENOUS CONTINUOUS
Status: DISCONTINUED | OUTPATIENT
Start: 2022-05-20 | End: 2022-05-20 | Stop reason: HOSPADM

## 2022-05-20 RX ORDER — TRAMADOL HYDROCHLORIDE 50 MG/1
50 TABLET ORAL EVERY 6 HOURS PRN
Status: DISCONTINUED | OUTPATIENT
Start: 2022-05-20 | End: 2022-05-20 | Stop reason: HOSPADM

## 2022-05-20 RX ORDER — LANOLIN ALCOHOL/MO/W.PET/CERES
3 CREAM (GRAM) TOPICAL NIGHTLY PRN
Status: DISCONTINUED | OUTPATIENT
Start: 2022-05-20 | End: 2022-05-20 | Stop reason: HOSPADM

## 2022-05-20 RX ADMIN — HEPARIN SODIUM 5000 UNITS: 5000 INJECTION INTRAVENOUS; SUBCUTANEOUS at 05:33

## 2022-05-20 RX ADMIN — METOPROLOL TARTRATE 50 MG: 25 TABLET, FILM COATED ORAL at 14:51

## 2022-05-20 RX ADMIN — ASPIRIN 81 MG: 81 TABLET, COATED ORAL at 14:51

## 2022-05-20 RX ADMIN — CLOPIDOGREL BISULFATE 75 MG: 75 TABLET, FILM COATED ORAL at 14:51

## 2022-05-20 RX ADMIN — SODIUM CHLORIDE, PRESERVATIVE FREE 2 ML: 5 INJECTION INTRAVENOUS at 00:47

## 2022-05-20 RX ADMIN — INSULIN LISPRO 1 UNITS: 100 INJECTION, SOLUTION INTRAVENOUS; SUBCUTANEOUS at 11:54

## 2022-05-20 RX ADMIN — SODIUM CHLORIDE, PRESERVATIVE FREE 2 ML: 5 INJECTION INTRAVENOUS at 09:00

## 2022-05-20 RX ADMIN — HEPARIN SODIUM 5000 UNITS: 5000 INJECTION INTRAVENOUS; SUBCUTANEOUS at 00:45

## 2022-05-20 RX ADMIN — FAMOTIDINE 20 MG: 20 TABLET ORAL at 14:52

## 2022-05-20 RX ADMIN — HEPARIN SODIUM 5000 UNITS: 5000 INJECTION INTRAVENOUS; SUBCUTANEOUS at 14:54

## 2022-05-20 RX ADMIN — GLIPIZIDE 10 MG: 5 TABLET ORAL at 18:27

## 2022-05-20 RX ADMIN — SODIUM CHLORIDE: 9 INJECTION, SOLUTION INTRAVENOUS at 15:50

## 2022-05-20 RX ADMIN — SODIUM CHLORIDE 500 ML: 9 INJECTION, SOLUTION INTRAVENOUS at 13:08

## 2022-05-20 ASSESSMENT — PATIENT HEALTH QUESTIONNAIRE - PHQ9
CLINICAL INTERPRETATION OF PHQ2 SCORE: NO FURTHER SCREENING NEEDED
2. FEELING DOWN, DEPRESSED OR HOPELESS: NOT AT ALL
SUM OF ALL RESPONSES TO PHQ9 QUESTIONS 1 AND 2: 0
IS PATIENT ABLE TO COMPLETE PHQ2 OR PHQ9: YES
1. LITTLE INTEREST OR PLEASURE IN DOING THINGS: NOT AT ALL
SUM OF ALL RESPONSES TO PHQ9 QUESTIONS 1 AND 2: 0

## 2022-05-20 ASSESSMENT — ENCOUNTER SYMPTOMS
ABDOMINAL PAIN: 0
CONSTIPATION: 0
VOMITING: 0
COLOR CHANGE: 0
CHILLS: 0
HEADACHES: 0
CHEST TIGHTNESS: 0
NUMBNESS: 0
FEVER: 0
FATIGUE: 0
SORE THROAT: 0
DIAPHORESIS: 1
DIARRHEA: 0
LIGHT-HEADEDNESS: 1
WEAKNESS: 0
SHORTNESS OF BREATH: 0
DIZZINESS: 0

## 2022-05-20 ASSESSMENT — ACTIVITIES OF DAILY LIVING (ADL)
DESCRIBE HOW PAIN IMPACTS YOUR LIFE: NONE/CAN MANAGE
CHRONIC_PAIN_PRESENT: YES, CHRONIC
RECENT_DECLINE_ADL: NO
ADL_SHORT_OF_BREATH: NO
ADL_SCORE: 12
ADL_BEFORE_ADMISSION: INDEPENDENT

## 2022-05-20 ASSESSMENT — COGNITIVE AND FUNCTIONAL STATUS - GENERAL
BECAUSE OF A PHYSICAL, MENTAL, OR EMOTIONAL CONDITION, DO YOU HAVE DIFFICULTY DOING ERRANDS ALONE: NO
BECAUSE OF A PHYSICAL, MENTAL, OR EMOTIONAL CONDITION, DO YOU HAVE SERIOUS DIFFICULTY CONCENTRATING, REMEMBERING OR MAKING DECISIONS: NO
ARE YOU DEAF OR DO YOU HAVE SERIOUS DIFFICULTY  HEARING: YES
DO YOU HAVE SERIOUS DIFFICULTY WALKING OR CLIMBING STAIRS: NO
DO YOU HAVE DIFFICULTY DRESSING OR BATHING: NO
ARE YOU BLIND OR DO YOU HAVE SERIOUS DIFFICULTY SEEING, EVEN WHEN WEARING GLASSES: NO

## 2022-05-20 ASSESSMENT — COLUMBIA-SUICIDE SEVERITY RATING SCALE - C-SSRS
1. WITHIN THE PAST MONTH, HAVE YOU WISHED YOU WERE DEAD OR WISHED YOU COULD GO TO SLEEP AND NOT WAKE UP?: NO
6. HAVE YOU EVER DONE ANYTHING, STARTED TO DO ANYTHING, OR PREPARED TO DO ANYTHING TO END YOUR LIFE?: NO
2. HAVE YOU ACTUALLY HAD ANY THOUGHTS OF KILLING YOURSELF?: NO
IS THE PATIENT ABLE TO COMPLETE C-SSRS: YES

## 2022-05-20 ASSESSMENT — LIFESTYLE VARIABLES
ALCOHOL_USE_STATUS: NO OR LOW RISK WITH VALIDATED TOOL
HOW OFTEN DO YOU HAVE 6 OR MORE DRINKS ON ONE OCCASION: NEVER
HOW MANY STANDARD DRINKS CONTAINING ALCOHOL DO YOU HAVE ON A TYPICAL DAY: 0,1 OR 2
HOW OFTEN DO YOU HAVE A DRINK CONTAINING ALCOHOL: NEVER
AUDIT-C TOTAL SCORE: 0

## 2022-05-20 ASSESSMENT — PAIN SCALES - GENERAL
PAINLEVEL_OUTOF10: 0
PAINLEVEL_OUTOF10: 0

## 2022-06-03 ENCOUNTER — OFFICE VISIT (OUTPATIENT)
Dept: CARDIOLOGY | Age: 77
End: 2022-06-03

## 2022-06-03 VITALS
HEIGHT: 71 IN | HEART RATE: 80 BPM | SYSTOLIC BLOOD PRESSURE: 132 MMHG | DIASTOLIC BLOOD PRESSURE: 68 MMHG | BODY MASS INDEX: 25.2 KG/M2 | WEIGHT: 180 LBS

## 2022-06-03 DIAGNOSIS — I25.10 CORONARY ARTERY DISEASE INVOLVING NATIVE HEART WITHOUT ANGINA PECTORIS, UNSPECIFIED VESSEL OR LESION TYPE: Primary | ICD-10-CM

## 2022-06-03 DIAGNOSIS — E78.5 HYPERLIPIDEMIA, UNSPECIFIED HYPERLIPIDEMIA TYPE: ICD-10-CM

## 2022-06-03 DIAGNOSIS — I47.19 ATRIAL TACHYCARDIA: ICD-10-CM

## 2022-06-03 DIAGNOSIS — R42 DIZZINESS: ICD-10-CM

## 2022-06-03 PROCEDURE — 99214 OFFICE O/P EST MOD 30 MIN: CPT | Performed by: INTERNAL MEDICINE

## 2022-06-03 RX ORDER — FINASTERIDE 1 MG/1
2 TABLET, FILM COATED ORAL DAILY
COMMUNITY
End: 2022-06-03 | Stop reason: SDUPTHER

## 2022-06-03 RX ORDER — FINASTERIDE 1 MG/1
2 TABLET, FILM COATED ORAL DAILY
Qty: 180 TABLET | Refills: 1 | Status: SHIPPED | OUTPATIENT
Start: 2022-06-03

## 2022-06-03 ASSESSMENT — PATIENT HEALTH QUESTIONNAIRE - PHQ9
SUM OF ALL RESPONSES TO PHQ9 QUESTIONS 1 AND 2: 0
1. LITTLE INTEREST OR PLEASURE IN DOING THINGS: NOT AT ALL
CLINICAL INTERPRETATION OF PHQ2 SCORE: NO FURTHER SCREENING NEEDED
SUM OF ALL RESPONSES TO PHQ9 QUESTIONS 1 AND 2: 0
2. FEELING DOWN, DEPRESSED OR HOPELESS: NOT AT ALL

## 2022-06-03 ASSESSMENT — ENCOUNTER SYMPTOMS
SHORTNESS OF BREATH: 0
EYES NEGATIVE: 1
GASTROINTESTINAL NEGATIVE: 1
PSYCHIATRIC NEGATIVE: 1
RESPIRATORY NEGATIVE: 1
ORTHOPNEA: 0
COUGH: 0
NEUROLOGICAL NEGATIVE: 1
ALLERGIC/IMMUNOLOGIC NEGATIVE: 1
DIZZINESS: 0
SYNCOPE: 0
CONSTITUTIONAL NEGATIVE: 1
ENDOCRINE NEGATIVE: 1
HEMATOLOGIC/LYMPHATIC NEGATIVE: 1

## 2022-06-23 ENCOUNTER — EXTERNAL LAB (OUTPATIENT)
Dept: OTHER | Age: 77
End: 2022-06-23

## 2022-07-18 ENCOUNTER — EXTERNAL RECORD (OUTPATIENT)
Dept: HEALTH INFORMATION MANAGEMENT | Facility: OTHER | Age: 77
End: 2022-07-18

## 2022-09-28 ENCOUNTER — TELEPHONE (OUTPATIENT)
Dept: CARDIOLOGY | Age: 77
End: 2022-09-28

## 2022-10-12 ENCOUNTER — TELEPHONE (OUTPATIENT)
Dept: CARDIOLOGY | Age: 77
End: 2022-10-12

## 2022-10-14 ENCOUNTER — OFFICE VISIT (OUTPATIENT)
Dept: CARDIOLOGY | Age: 77
End: 2022-10-14

## 2022-10-14 VITALS
SYSTOLIC BLOOD PRESSURE: 126 MMHG | HEART RATE: 72 BPM | DIASTOLIC BLOOD PRESSURE: 78 MMHG | HEIGHT: 71 IN | BODY MASS INDEX: 24.22 KG/M2 | WEIGHT: 173 LBS

## 2022-10-14 DIAGNOSIS — I25.10 CORONARY ARTERY DISEASE INVOLVING NATIVE HEART WITHOUT ANGINA PECTORIS, UNSPECIFIED VESSEL OR LESION TYPE: ICD-10-CM

## 2022-10-14 DIAGNOSIS — E78.5 HYPERLIPIDEMIA, UNSPECIFIED HYPERLIPIDEMIA TYPE: ICD-10-CM

## 2022-10-14 DIAGNOSIS — I95.1 ORTHOSTATIC HYPOTENSION: ICD-10-CM

## 2022-10-14 DIAGNOSIS — R42 DIZZINESS: ICD-10-CM

## 2022-10-14 DIAGNOSIS — Z01.810 PREOP CARDIOVASCULAR EXAM: Primary | ICD-10-CM

## 2022-10-14 DIAGNOSIS — I47.19 ATRIAL TACHYCARDIA: ICD-10-CM

## 2022-10-14 LAB — LAB RESULT: NORMAL

## 2022-10-14 PROCEDURE — 99214 OFFICE O/P EST MOD 30 MIN: CPT | Performed by: INTERNAL MEDICINE

## 2022-10-14 ASSESSMENT — ENCOUNTER SYMPTOMS
SYNCOPE: 0
RESPIRATORY NEGATIVE: 1
ENDOCRINE NEGATIVE: 1
GASTROINTESTINAL NEGATIVE: 1
EYES NEGATIVE: 1
CONSTITUTIONAL NEGATIVE: 1
HEMATOLOGIC/LYMPHATIC NEGATIVE: 1
DIZZINESS: 0
NEUROLOGICAL NEGATIVE: 1
SHORTNESS OF BREATH: 0
ORTHOPNEA: 0
PSYCHIATRIC NEGATIVE: 1
COUGH: 0
ALLERGIC/IMMUNOLOGIC NEGATIVE: 1

## 2022-11-15 ENCOUNTER — APPOINTMENT (OUTPATIENT)
Dept: CARDIOLOGY | Age: 77
End: 2022-11-15

## 2022-12-14 ENCOUNTER — APPOINTMENT (OUTPATIENT)
Dept: URBAN - METROPOLITAN AREA CLINIC 317 | Age: 77
Setting detail: DERMATOLOGY
End: 2022-12-14

## 2022-12-14 DIAGNOSIS — D18.0 HEMANGIOMA: ICD-10-CM

## 2022-12-14 DIAGNOSIS — Z85.828 PERSONAL HISTORY OF OTHER MALIGNANT NEOPLASM OF SKIN: ICD-10-CM

## 2022-12-14 DIAGNOSIS — L57.0 ACTINIC KERATOSIS: ICD-10-CM

## 2022-12-14 DIAGNOSIS — D22 MELANOCYTIC NEVI: ICD-10-CM

## 2022-12-14 DIAGNOSIS — L82.1 OTHER SEBORRHEIC KERATOSIS: ICD-10-CM

## 2022-12-14 DIAGNOSIS — Z71.89 OTHER SPECIFIED COUNSELING: ICD-10-CM

## 2022-12-14 DIAGNOSIS — L81.4 OTHER MELANIN HYPERPIGMENTATION: ICD-10-CM

## 2022-12-14 PROBLEM — D18.01 HEMANGIOMA OF SKIN AND SUBCUTANEOUS TISSUE: Status: ACTIVE | Noted: 2022-12-14

## 2022-12-14 PROBLEM — D22.5 MELANOCYTIC NEVI OF TRUNK: Status: ACTIVE | Noted: 2022-12-14

## 2022-12-14 PROCEDURE — 17003 DESTRUCT PREMALG LES 2-14: CPT

## 2022-12-14 PROCEDURE — OTHER COUNSELING: OTHER

## 2022-12-14 PROCEDURE — OTHER PRESCRIPTION: OTHER

## 2022-12-14 PROCEDURE — OTHER LIQUID NITROGEN: OTHER

## 2022-12-14 PROCEDURE — 17000 DESTRUCT PREMALG LESION: CPT

## 2022-12-14 PROCEDURE — OTHER MONITORING: OTHER

## 2022-12-14 PROCEDURE — 99213 OFFICE O/P EST LOW 20 MIN: CPT | Mod: 25

## 2022-12-14 RX ORDER — FLUOROURACIL 2 G/40G
CREAM TOPICAL BID
Qty: 40 | Refills: 2 | Status: ERX | COMMUNITY
Start: 2022-12-14

## 2022-12-14 ASSESSMENT — LOCATION DETAILED DESCRIPTION DERM
LOCATION DETAILED: LEFT ANTERIOR DISTAL UPPER ARM
LOCATION DETAILED: RIGHT MID TEMPLE
LOCATION DETAILED: LEFT INFERIOR CENTRAL MALAR CHEEK
LOCATION DETAILED: LEFT MID TEMPLE
LOCATION DETAILED: RIGHT LATERAL MALAR CHEEK
LOCATION DETAILED: POSTERIOR MID-PARIETAL SCALP
LOCATION DETAILED: SUBXIPHOID

## 2022-12-14 ASSESSMENT — LOCATION ZONE DERM
LOCATION ZONE: TRUNK
LOCATION ZONE: SCALP
LOCATION ZONE: FACE
LOCATION ZONE: ARM

## 2022-12-14 ASSESSMENT — LOCATION SIMPLE DESCRIPTION DERM
LOCATION SIMPLE: POSTERIOR SCALP
LOCATION SIMPLE: LEFT CHEEK
LOCATION SIMPLE: LEFT TEMPLE
LOCATION SIMPLE: LEFT UPPER ARM
LOCATION SIMPLE: ABDOMEN
LOCATION SIMPLE: RIGHT TEMPLE
LOCATION SIMPLE: RIGHT CHEEK

## 2022-12-14 NOTE — PROCEDURE: LIQUID NITROGEN
Post-Care Instructions: I reviewed with the patient in detail post-care instructions. Patient is to wear a broad spectrum mineral based sunscreen, SPF 30-50. Instructed patient to apply aquaphor or petrolatum to treated sites.
Number Of Freeze-Thaw Cycles: 3 freeze-thaw cycles
Render In Bullet Format When Appropriate: No
Consent: The patient's consent was obtained including but not limited to risks of crusting, scabbing, blistering, scarring, darker or lighter pigmentary change, recurrence, incomplete treatment response and infection.
Duration Of Freeze Thaw-Cycle (Seconds): 3
Render Post-Care Instructions In Note?: yes
Detail Level: Simple
Total Number Of Aks Treated: 4

## 2022-12-29 ENCOUNTER — EXTERNAL LAB (OUTPATIENT)
Dept: OTHER | Age: 77
End: 2022-12-29

## 2022-12-29 LAB — LAB RESULT: NORMAL

## 2023-01-20 ENCOUNTER — APPOINTMENT (OUTPATIENT)
Dept: ULTRASOUND IMAGING | Age: 78
End: 2023-01-20
Attending: UROLOGY

## 2023-01-20 ENCOUNTER — IMAGING SERVICES (OUTPATIENT)
Dept: ULTRASOUND IMAGING | Age: 78
End: 2023-01-20
Attending: UROLOGY

## 2023-01-20 DIAGNOSIS — N13.2 HYDRONEPHROSIS WITH RENAL AND URETERAL CALCULUS OBSTRUCTION: ICD-10-CM

## 2023-01-20 PROCEDURE — 76775 US EXAM ABDO BACK WALL LIM: CPT | Performed by: RADIOLOGY

## 2023-04-12 ENCOUNTER — CLINICAL ABSTRACT (OUTPATIENT)
Dept: HEALTH INFORMATION MANAGEMENT | Facility: OTHER | Age: 78
End: 2023-04-12

## 2023-04-17 ENCOUNTER — APPOINTMENT (OUTPATIENT)
Dept: CARDIOLOGY | Age: 78
End: 2023-04-17

## 2023-04-18 ENCOUNTER — OFFICE VISIT (OUTPATIENT)
Dept: CARDIOLOGY | Age: 78
End: 2023-04-18

## 2023-04-18 VITALS
HEART RATE: 64 BPM | WEIGHT: 178 LBS | HEIGHT: 71 IN | SYSTOLIC BLOOD PRESSURE: 138 MMHG | DIASTOLIC BLOOD PRESSURE: 70 MMHG | BODY MASS INDEX: 24.92 KG/M2

## 2023-04-18 DIAGNOSIS — I25.10 CORONARY ARTERY DISEASE INVOLVING NATIVE HEART WITHOUT ANGINA PECTORIS, UNSPECIFIED VESSEL OR LESION TYPE: Primary | ICD-10-CM

## 2023-04-18 DIAGNOSIS — I47.19 ATRIAL TACHYCARDIA: ICD-10-CM

## 2023-04-18 DIAGNOSIS — E78.5 HYPERLIPIDEMIA, UNSPECIFIED HYPERLIPIDEMIA TYPE: ICD-10-CM

## 2023-04-18 PROCEDURE — 99214 OFFICE O/P EST MOD 30 MIN: CPT | Performed by: INTERNAL MEDICINE

## 2023-04-18 ASSESSMENT — ENCOUNTER SYMPTOMS
CONSTITUTIONAL NEGATIVE: 1
DIZZINESS: 0
ALLERGIC/IMMUNOLOGIC NEGATIVE: 1
RESPIRATORY NEGATIVE: 1
ORTHOPNEA: 0
ENDOCRINE NEGATIVE: 1
EYES NEGATIVE: 1
NEUROLOGICAL NEGATIVE: 1
COUGH: 0
SYNCOPE: 0
SHORTNESS OF BREATH: 0
GASTROINTESTINAL NEGATIVE: 1
HEMATOLOGIC/LYMPHATIC NEGATIVE: 1
PSYCHIATRIC NEGATIVE: 1

## 2023-04-22 ENCOUNTER — EXTERNAL LAB (OUTPATIENT)
Dept: OTHER | Age: 78
End: 2023-04-22

## 2023-04-22 LAB
CHOLEST SERPL-MCNC: 99 MG/DL
CHOLEST/HDLC SERPL: 2 RATIO (ref 0–5)
HDLC SERPL-MCNC: 41.9 MG/DL
LAB RESULT: NORMAL
LDLC SERPL CALC-MCNC: 35 MG/DL
LENGTH OF FAST TIME PATIENT: YES H
TRIGL SERPL-MCNC: 112 MG/DL
VLDLC SERPL CALC-MCNC: 22 MG/DL

## 2023-06-15 ENCOUNTER — APPOINTMENT (OUTPATIENT)
Dept: URBAN - METROPOLITAN AREA CLINIC 317 | Age: 78
Setting detail: DERMATOLOGY
End: 2023-06-16

## 2023-06-15 DIAGNOSIS — L57.0 ACTINIC KERATOSIS: ICD-10-CM

## 2023-06-15 DIAGNOSIS — D49.2 NEOPLASM OF UNSPECIFIED BEHAVIOR OF BONE, SOFT TISSUE, AND SKIN: ICD-10-CM

## 2023-06-15 DIAGNOSIS — L57.8 OTHER SKIN CHANGES DUE TO CHRONIC EXPOSURE TO NONIONIZING RADIATION: ICD-10-CM

## 2023-06-15 DIAGNOSIS — Z85.828 PERSONAL HISTORY OF OTHER MALIGNANT NEOPLASM OF SKIN: ICD-10-CM

## 2023-06-15 DIAGNOSIS — Z71.89 OTHER SPECIFIED COUNSELING: ICD-10-CM

## 2023-06-15 DIAGNOSIS — D18.0 HEMANGIOMA: ICD-10-CM

## 2023-06-15 PROBLEM — D18.01 HEMANGIOMA OF SKIN AND SUBCUTANEOUS TISSUE: Status: ACTIVE | Noted: 2023-06-15

## 2023-06-15 PROCEDURE — OTHER COUNSELING: OTHER

## 2023-06-15 PROCEDURE — A4550 SURGICAL TRAYS: HCPCS

## 2023-06-15 PROCEDURE — OTHER SHAVE REMOVAL: OTHER

## 2023-06-15 PROCEDURE — OTHER MIPS QUALITY: OTHER

## 2023-06-15 PROCEDURE — 17004 DESTROY PREMAL LESIONS 15/>: CPT

## 2023-06-15 PROCEDURE — OTHER LIQUID NITROGEN: OTHER

## 2023-06-15 PROCEDURE — 99213 OFFICE O/P EST LOW 20 MIN: CPT | Mod: 25

## 2023-06-15 PROCEDURE — 11302 SHAVE SKIN LESION 1.1-2.0 CM: CPT | Mod: 59

## 2023-06-15 ASSESSMENT — LOCATION SIMPLE DESCRIPTION DERM
LOCATION SIMPLE: INFERIOR FOREHEAD
LOCATION SIMPLE: LEFT OCCIPITAL SCALP
LOCATION SIMPLE: RIGHT FOREARM
LOCATION SIMPLE: LEFT FOREARM
LOCATION SIMPLE: LEFT FOREHEAD
LOCATION SIMPLE: RIGHT OCCIPITAL SCALP
LOCATION SIMPLE: LEFT CHEEK
LOCATION SIMPLE: LEFT SCALP
LOCATION SIMPLE: LEFT UPPER BACK
LOCATION SIMPLE: POSTERIOR SCALP
LOCATION SIMPLE: RIGHT FOREHEAD
LOCATION SIMPLE: RIGHT CHEEK
LOCATION SIMPLE: LEFT EAR
LOCATION SIMPLE: CHEST
LOCATION SIMPLE: SCALP

## 2023-06-15 ASSESSMENT — LOCATION DETAILED DESCRIPTION DERM
LOCATION DETAILED: RIGHT SUPERIOR FOREHEAD
LOCATION DETAILED: LEFT SUPERIOR OCCIPITAL SCALP
LOCATION DETAILED: MID-OCCIPITAL SCALP
LOCATION DETAILED: INFERIOR MID FOREHEAD
LOCATION DETAILED: RIGHT VENTRAL PROXIMAL FOREARM
LOCATION DETAILED: LEFT INFERIOR MEDIAL MALAR CHEEK
LOCATION DETAILED: LEFT SUPERIOR LATERAL MALAR CHEEK
LOCATION DETAILED: RIGHT PROXIMAL DORSAL FOREARM
LOCATION DETAILED: LEFT SUPERIOR FOREHEAD
LOCATION DETAILED: LEFT SUPERIOR PREAURICULAR CHEEK
LOCATION DETAILED: LEFT FOREHEAD
LOCATION DETAILED: LEFT SUPERIOR HELIX
LOCATION DETAILED: RIGHT CENTRAL MALAR CHEEK
LOCATION DETAILED: RIGHT SUPERIOR OCCIPITAL SCALP
LOCATION DETAILED: LEFT MEDIAL UPPER BACK
LOCATION DETAILED: LEFT PROXIMAL DORSAL FOREARM
LOCATION DETAILED: LEFT INFERIOR POSTAURICULAR SKIN
LOCATION DETAILED: LEFT CENTRAL FRONTAL SCALP
LOCATION DETAILED: RIGHT MEDIAL SUPERIOR CHEST
LOCATION DETAILED: RIGHT SUPERIOR MEDIAL FOREHEAD

## 2023-06-15 ASSESSMENT — LOCATION ZONE DERM
LOCATION ZONE: TRUNK
LOCATION ZONE: EAR
LOCATION ZONE: FACE
LOCATION ZONE: SCALP
LOCATION ZONE: ARM

## 2023-06-15 NOTE — PROCEDURE: SHAVE REMOVAL
Body Location Override (Optional - Billing Will Still Be Based On Selected Body Map Location If Applicable): right forearm
Hemostasis: Drysol
X Size Of Lesion In Cm (Optional): 0
Detail Level: Detailed
Anesthesia Type: 1% lidocaine with epinephrine
Depth Of Shave: dermis
Wound Care: Petrolatum
Size Of Lesion In Cm (Required): 1.1
Medical Necessity Information: It is in your best interest to select a reason for this procedure from the list below. All of these items fulfill various CMS LCD requirements except the new and changing color options.
Consent was obtained from the patient. The risks and benefits to therapy were discussed in detail. Specifically, the risks of infection, scarring, bleeding, prolonged wound healing, incomplete removal, allergy to anesthesia, nerve injury and recurrence were addressed. Prior to the procedure, the treatment site was clearly identified and confirmed by the patient. All components of Universal Protocol/PAUSE Rule completed.
Add Variable For Additional Medical Justification: No
Was A Bandage Applied: Yes
Post-Care Instructions: I reviewed with the patient in detail post-care instructions. Patient is to keep the biopsy site dry overnight, and then apply bacitracin twice daily until healed. Patient may apply hydrogen peroxide soaks to remove any crusting.
Billing Type: Third-Party Bill
Medical Necessity Clause: This procedure was medically necessary because the lesion that was treated was:
Notification Instructions: Patient will be notified of pathology results. However, patient instructed to call the office if not contacted within 2 weeks.
Biopsy Method: Dermablade

## 2023-06-15 NOTE — PROCEDURE: LIQUID NITROGEN
Duration Of Freeze Thaw-Cycle (Seconds): 5
Show Applicator Variable?: Yes
Render Note In Bullet Format When Appropriate: No
Consent: The patient's consent was obtained including but not limited to risks of crusting, scabbing, blistering, scarring, darker or lighter pigmentary change, recurrence, incomplete removal and infection.
Number Of Freeze-Thaw Cycles: 3 freeze-thaw cycles
Post-Care Instructions: I reviewed with the patient in detail post-care instructions. Patient is to wear sunprotection, and avoid picking at any of the treated lesions. Pt may apply Vaseline to crusted or scabbing areas.
Detail Level: Detailed

## 2023-06-20 ENCOUNTER — RX ONLY (RX ONLY)
Age: 78
End: 2023-06-20

## 2023-06-20 RX ORDER — CEPHALEXIN 500 MG/1
CAPSULE ORAL
Qty: 4 | Refills: 0 | Status: ERX | COMMUNITY
Start: 2023-06-20

## 2023-06-27 ENCOUNTER — RX ONLY (RX ONLY)
Age: 78
End: 2023-06-27

## 2023-06-27 RX ORDER — MUPIROCIN 20 MG/G
OINTMENT TOPICAL
Qty: 15 | Refills: 0 | Status: ERX | COMMUNITY
Start: 2023-06-27

## 2023-08-11 ENCOUNTER — TELEPHONE (OUTPATIENT)
Dept: CARDIOLOGY | Age: 78
End: 2023-08-11

## 2023-08-23 ENCOUNTER — TELEPHONE (OUTPATIENT)
Dept: CARDIOLOGY | Age: 78
End: 2023-08-23

## 2023-08-23 DIAGNOSIS — I63.9 CEREBROVASCULAR ACCIDENT (CVA), UNSPECIFIED MECHANISM (CMD): ICD-10-CM

## 2023-08-23 DIAGNOSIS — I25.10 CORONARY ARTERY DISEASE INVOLVING NATIVE HEART WITHOUT ANGINA PECTORIS, UNSPECIFIED VESSEL OR LESION TYPE: Primary | ICD-10-CM

## 2023-08-23 DIAGNOSIS — Z01.818 PREOPERATIVE CLEARANCE: ICD-10-CM

## 2023-08-28 ENCOUNTER — APPOINTMENT (OUTPATIENT)
Dept: URBAN - METROPOLITAN AREA CLINIC 317 | Age: 78
Setting detail: DERMATOLOGY
End: 2023-08-29

## 2023-08-28 DIAGNOSIS — L57.0 ACTINIC KERATOSIS: ICD-10-CM

## 2023-08-28 DIAGNOSIS — L72.8 OTHER FOLLICULAR CYSTS OF THE SKIN AND SUBCUTANEOUS TISSUE: ICD-10-CM

## 2023-08-28 PROBLEM — D04.61 CARCINOMA IN SITU OF SKIN OF RIGHT UPPER LIMB, INCLUDING SHOULDER: Status: ACTIVE | Noted: 2023-08-28

## 2023-08-28 PROCEDURE — 17263 DSTRJ MAL LES T/A/L 2.1-3.0: CPT

## 2023-08-28 PROCEDURE — OTHER LIQUID NITROGEN: OTHER

## 2023-08-28 PROCEDURE — OTHER COUNSELING: OTHER

## 2023-08-28 PROCEDURE — 17000 DESTRUCT PREMALG LESION: CPT | Mod: 59

## 2023-08-28 PROCEDURE — OTHER CURETTAGE AND DESTRUCTION: OTHER

## 2023-08-28 PROCEDURE — 99212 OFFICE O/P EST SF 10 MIN: CPT | Mod: 25

## 2023-08-28 PROCEDURE — 17003 DESTRUCT PREMALG LES 2-14: CPT

## 2023-08-28 PROCEDURE — A4550 SURGICAL TRAYS: HCPCS

## 2023-08-28 ASSESSMENT — LOCATION ZONE DERM
LOCATION ZONE: TRUNK
LOCATION ZONE: FACE
LOCATION ZONE: SCALP

## 2023-08-28 ASSESSMENT — LOCATION SIMPLE DESCRIPTION DERM
LOCATION SIMPLE: LEFT CHEEK
LOCATION SIMPLE: FRONTAL SCALP
LOCATION SIMPLE: LEFT UPPER BACK

## 2023-08-28 ASSESSMENT — LOCATION DETAILED DESCRIPTION DERM
LOCATION DETAILED: LEFT INFERIOR MEDIAL UPPER BACK
LOCATION DETAILED: LEFT LATERAL MALAR CHEEK
LOCATION DETAILED: MEDIAL FRONTAL SCALP

## 2023-08-28 NOTE — PROCEDURE: COUNSELING
Detail Level: Detailed
Patient Specific Counseling (Will Not Stick From Patient To Patient): schedule excision with Dr. Gonzales

## 2023-09-27 ENCOUNTER — ANCILLARY PROCEDURE (OUTPATIENT)
Dept: CARDIOLOGY | Age: 78
End: 2023-09-27
Attending: INTERNAL MEDICINE

## 2023-09-27 ENCOUNTER — CLINICAL ABSTRACT (OUTPATIENT)
Dept: HEALTH INFORMATION MANAGEMENT | Facility: OTHER | Age: 78
End: 2023-09-27

## 2023-09-27 DIAGNOSIS — I63.9 CEREBROVASCULAR ACCIDENT (CVA), UNSPECIFIED MECHANISM (CMD): ICD-10-CM

## 2023-09-27 DIAGNOSIS — Z01.818 PREOPERATIVE CLEARANCE: ICD-10-CM

## 2023-09-27 DIAGNOSIS — I25.10 CORONARY ARTERY DISEASE INVOLVING NATIVE HEART WITHOUT ANGINA PECTORIS, UNSPECIFIED VESSEL OR LESION TYPE: ICD-10-CM

## 2023-09-27 PROCEDURE — 78452 HT MUSCLE IMAGE SPECT MULT: CPT | Performed by: INTERNAL MEDICINE

## 2023-09-27 PROCEDURE — 93015 CV STRESS TEST SUPVJ I&R: CPT | Performed by: INTERNAL MEDICINE

## 2023-09-27 PROCEDURE — A9502 TC99M TETROFOSMIN: HCPCS | Performed by: INTERNAL MEDICINE

## 2023-09-27 RX ORDER — REGADENOSON 0.08 MG/ML
0.4 INJECTION, SOLUTION INTRAVENOUS ONCE
Status: COMPLETED | OUTPATIENT
Start: 2023-09-27 | End: 2023-09-27

## 2023-09-27 RX ADMIN — REGADENOSON 0.4 MG: 0.08 INJECTION, SOLUTION INTRAVENOUS at 14:05

## 2023-09-28 LAB — STRESS TARGET HR: 141 BPM

## 2023-09-29 ENCOUNTER — EXTERNAL RECORD (OUTPATIENT)
Dept: HEALTH INFORMATION MANAGEMENT | Facility: OTHER | Age: 78
End: 2023-09-29

## 2023-10-11 ENCOUNTER — OFFICE VISIT (OUTPATIENT)
Dept: CARDIOLOGY | Age: 78
End: 2023-10-11

## 2023-10-11 VITALS
DIASTOLIC BLOOD PRESSURE: 68 MMHG | SYSTOLIC BLOOD PRESSURE: 134 MMHG | WEIGHT: 180 LBS | BODY MASS INDEX: 25.2 KG/M2 | HEART RATE: 60 BPM | HEIGHT: 71 IN

## 2023-10-11 DIAGNOSIS — I25.10 CORONARY ARTERY DISEASE INVOLVING NATIVE HEART WITHOUT ANGINA PECTORIS, UNSPECIFIED VESSEL OR LESION TYPE: Primary | ICD-10-CM

## 2023-10-11 DIAGNOSIS — E78.5 HYPERLIPIDEMIA, UNSPECIFIED HYPERLIPIDEMIA TYPE: ICD-10-CM

## 2023-10-11 DIAGNOSIS — I95.1 ORTHOSTATIC HYPOTENSION: ICD-10-CM

## 2023-10-11 PROCEDURE — 99214 OFFICE O/P EST MOD 30 MIN: CPT | Performed by: INTERNAL MEDICINE

## 2023-10-11 ASSESSMENT — ENCOUNTER SYMPTOMS
DIZZINESS: 0
NEUROLOGICAL NEGATIVE: 1
ENDOCRINE NEGATIVE: 1
PSYCHIATRIC NEGATIVE: 1
GASTROINTESTINAL NEGATIVE: 1
COUGH: 0
CONSTITUTIONAL NEGATIVE: 1
HEMATOLOGIC/LYMPHATIC NEGATIVE: 1
SHORTNESS OF BREATH: 0
SYNCOPE: 0
EYES NEGATIVE: 1
ALLERGIC/IMMUNOLOGIC NEGATIVE: 1
RESPIRATORY NEGATIVE: 1
ORTHOPNEA: 0

## 2023-10-19 ENCOUNTER — APPOINTMENT (OUTPATIENT)
Dept: CARDIOLOGY | Age: 78
End: 2023-10-19

## 2023-10-23 ENCOUNTER — TELEPHONE (OUTPATIENT)
Dept: CARDIOLOGY | Age: 78
End: 2023-10-23

## 2023-12-22 ENCOUNTER — EXTERNAL RECORD (OUTPATIENT)
Dept: HEALTH INFORMATION MANAGEMENT | Facility: OTHER | Age: 78
End: 2023-12-22

## 2024-04-15 ASSESSMENT — ENCOUNTER SYMPTOMS
COUGH: 0
DIZZINESS: 0
SHORTNESS OF BREATH: 0
EYES NEGATIVE: 1
CONSTITUTIONAL NEGATIVE: 1
ENDOCRINE NEGATIVE: 1
NEUROLOGICAL NEGATIVE: 1
RESPIRATORY NEGATIVE: 1
HEMATOLOGIC/LYMPHATIC NEGATIVE: 1
SYNCOPE: 0
GASTROINTESTINAL NEGATIVE: 1
ALLERGIC/IMMUNOLOGIC NEGATIVE: 1
PSYCHIATRIC NEGATIVE: 1
ORTHOPNEA: 0

## 2024-04-16 ENCOUNTER — APPOINTMENT (OUTPATIENT)
Dept: CARDIOLOGY | Age: 79
End: 2024-04-16

## 2024-04-16 VITALS
BODY MASS INDEX: 25.2 KG/M2 | HEIGHT: 71 IN | SYSTOLIC BLOOD PRESSURE: 126 MMHG | WEIGHT: 180 LBS | DIASTOLIC BLOOD PRESSURE: 70 MMHG

## 2024-04-16 DIAGNOSIS — I25.10 CORONARY ARTERY DISEASE INVOLVING NATIVE HEART WITHOUT ANGINA PECTORIS, UNSPECIFIED VESSEL OR LESION TYPE: Primary | ICD-10-CM

## 2024-04-16 DIAGNOSIS — E78.5 HYPERLIPIDEMIA, UNSPECIFIED HYPERLIPIDEMIA TYPE: ICD-10-CM

## 2024-04-16 PROCEDURE — 99214 OFFICE O/P EST MOD 30 MIN: CPT | Performed by: INTERNAL MEDICINE

## 2024-04-30 ENCOUNTER — EXTERNAL RECORD (OUTPATIENT)
Dept: HEALTH INFORMATION MANAGEMENT | Facility: OTHER | Age: 79
End: 2024-04-30

## 2024-10-16 ENCOUNTER — APPOINTMENT (OUTPATIENT)
Dept: CARDIOLOGY | Age: 79
End: 2024-10-16

## 2024-10-16 ENCOUNTER — OFFICE VISIT (OUTPATIENT)
Dept: CARDIOLOGY | Age: 79
End: 2024-10-16

## 2024-10-16 VITALS
WEIGHT: 184 LBS | HEIGHT: 71 IN | SYSTOLIC BLOOD PRESSURE: 122 MMHG | HEART RATE: 68 BPM | DIASTOLIC BLOOD PRESSURE: 60 MMHG | BODY MASS INDEX: 25.76 KG/M2

## 2024-10-16 DIAGNOSIS — E78.5 HYPERLIPIDEMIA, UNSPECIFIED HYPERLIPIDEMIA TYPE: ICD-10-CM

## 2024-10-16 DIAGNOSIS — I47.19 ATRIAL TACHYCARDIA (CMD): ICD-10-CM

## 2024-10-16 DIAGNOSIS — I25.10 CORONARY ARTERY DISEASE INVOLVING NATIVE HEART WITHOUT ANGINA PECTORIS, UNSPECIFIED VESSEL OR LESION TYPE: Primary | ICD-10-CM

## 2024-10-16 ASSESSMENT — ENCOUNTER SYMPTOMS
RESPIRATORY NEGATIVE: 1
COUGH: 0
ALLERGIC/IMMUNOLOGIC NEGATIVE: 1
ORTHOPNEA: 0
PSYCHIATRIC NEGATIVE: 1
HEMATOLOGIC/LYMPHATIC NEGATIVE: 1
GASTROINTESTINAL NEGATIVE: 1
EYES NEGATIVE: 1
DIZZINESS: 0
CONSTITUTIONAL NEGATIVE: 1
ENDOCRINE NEGATIVE: 1
NEUROLOGICAL NEGATIVE: 1
SYNCOPE: 0
SHORTNESS OF BREATH: 0

## 2024-10-17 ENCOUNTER — TELEPHONE (OUTPATIENT)
Dept: CARDIOLOGY | Age: 79
End: 2024-10-17

## 2024-10-17 RX ORDER — NITROGLYCERIN 0.4 MG/1
0.4 TABLET SUBLINGUAL EVERY 5 MIN PRN
Qty: 25 TABLET | Refills: 3 | Status: SHIPPED | OUTPATIENT
Start: 2024-10-17

## 2024-12-27 RX ORDER — METOPROLOL TARTRATE 25 MG/1
TABLET, FILM COATED ORAL
Qty: 270 TABLET | Refills: 1 | Status: SHIPPED | OUTPATIENT
Start: 2024-12-27

## 2025-01-02 PROBLEM — E11.65 TYPE 2 DIABETES MELLITUS WITH HYPERGLYCEMIA  (CMD): Status: ACTIVE | Noted: 2025-01-02

## 2025-01-02 PROBLEM — I10 ESSENTIAL HYPERTENSION: Status: ACTIVE | Noted: 2023-11-13

## 2025-02-05 ENCOUNTER — TELEPHONE (OUTPATIENT)
Dept: CARDIOLOGY | Age: 80
End: 2025-02-05

## 2025-04-16 ENCOUNTER — APPOINTMENT (OUTPATIENT)
Dept: CARDIOLOGY | Age: 80
End: 2025-04-16

## 2025-05-07 ENCOUNTER — OFFICE VISIT (OUTPATIENT)
Dept: CARDIOLOGY | Age: 80
End: 2025-05-07

## 2025-05-07 ENCOUNTER — APPOINTMENT (OUTPATIENT)
Dept: CARDIOLOGY | Age: 80
End: 2025-05-07

## 2025-05-07 VITALS
DIASTOLIC BLOOD PRESSURE: 68 MMHG | SYSTOLIC BLOOD PRESSURE: 128 MMHG | WEIGHT: 180.89 LBS | BODY MASS INDEX: 25.23 KG/M2 | HEART RATE: 74 BPM

## 2025-05-07 DIAGNOSIS — I63.9 CEREBROVASCULAR ACCIDENT (CVA), UNSPECIFIED MECHANISM  (CMD): ICD-10-CM

## 2025-05-07 DIAGNOSIS — E11.65 TYPE 2 DIABETES MELLITUS WITH HYPERGLYCEMIA, WITHOUT LONG-TERM CURRENT USE OF INSULIN (CMD): ICD-10-CM

## 2025-05-07 DIAGNOSIS — I10 ESSENTIAL HYPERTENSION: ICD-10-CM

## 2025-05-07 DIAGNOSIS — I25.10 CORONARY ARTERY DISEASE INVOLVING NATIVE HEART WITHOUT ANGINA PECTORIS, UNSPECIFIED VESSEL OR LESION TYPE: Primary | ICD-10-CM

## 2025-05-07 DIAGNOSIS — E78.5 HYPERLIPIDEMIA, UNSPECIFIED HYPERLIPIDEMIA TYPE: ICD-10-CM

## 2025-05-07 PROCEDURE — 3078F DIAST BP <80 MM HG: CPT

## 2025-05-07 PROCEDURE — 3074F SYST BP LT 130 MM HG: CPT

## 2025-05-07 PROCEDURE — 99215 OFFICE O/P EST HI 40 MIN: CPT

## 2026-05-07 ENCOUNTER — APPOINTMENT (OUTPATIENT)
Dept: CARDIOLOGY | Age: 81
End: 2026-05-07